# Patient Record
Sex: MALE | Race: WHITE
[De-identification: names, ages, dates, MRNs, and addresses within clinical notes are randomized per-mention and may not be internally consistent; named-entity substitution may affect disease eponyms.]

---

## 2017-01-02 ENCOUNTER — HOSPITAL ENCOUNTER (EMERGENCY)
Dept: HOSPITAL 10 - FTE | Age: 32
Discharge: HOME | End: 2017-01-02
Payer: COMMERCIAL

## 2017-01-02 VITALS
HEIGHT: 68 IN | WEIGHT: 142.2 LBS | BODY MASS INDEX: 21.55 KG/M2 | BODY MASS INDEX: 21.55 KG/M2 | HEIGHT: 68 IN | WEIGHT: 142.2 LBS

## 2017-01-02 DIAGNOSIS — E10.9: ICD-10-CM

## 2017-01-02 DIAGNOSIS — A08.4: Primary | ICD-10-CM

## 2017-01-02 LAB
ADD UMIC: NO
ALBUMIN SERPL-MCNC: 5 G/DL (ref 3.3–4.9)
ALBUMIN/GLOB SERPL: 1.38 {RATIO}
ALP SERPL-CCNC: 90 IU/L (ref 42–121)
ALT SERPL-CCNC: 35 IU/L (ref 13–69)
ANION GAP SERPL CALC-SCNC: 21 MMOL/L (ref 8–16)
AST SERPL-CCNC: 46 IU/L (ref 15–46)
BASOPHILS # BLD AUTO: 0 10^3/UL (ref 0–0.1)
BASOPHILS NFR BLD: 0.2 % (ref 0–2)
BILIRUB DIRECT SERPL-MCNC: 0 MG/DL (ref 0–0.2)
BILIRUB SERPL-MCNC: 0.4 MG/DL (ref 0.2–1.3)
BUN SERPL-MCNC: 14 MG/DL (ref 7–20)
CALCIUM SERPL-MCNC: 9.9 MG/DL (ref 8.4–10.2)
CHLORIDE SERPL-SCNC: 97 MMOL/L (ref 97–110)
CO2 SERPL-SCNC: 30 MMOL/L (ref 21–31)
COLOR UR: (no result)
CONDITION: 1
CREAT SERPL-MCNC: 0.66 MG/DL (ref 0.61–1.24)
EOSINOPHIL # BLD: 0 10^3/UL (ref 0–0.5)
EOSINOPHIL NFR BLD: 0.3 % (ref 0–7)
ERYTHROCYTE [DISTWIDTH] IN BLOOD BY AUTOMATED COUNT: 13.8 % (ref 11.5–14.5)
GLOBULIN SER-MCNC: 3.6 G/DL (ref 1.3–3.2)
GLUCOSE SERPL-MCNC: 290 MG/DL (ref 70–220)
GLUCOSE UR STRIP-MCNC: >=1000 %
HCT VFR BLD CALC: 52.8 % (ref 42–52)
HGB BLD-MCNC: 18 G/DL (ref 14–18)
KETONES UR STRIP.AUTO-MCNC: NEGATIVE MG/DL
LYMPHOCYTES # BLD AUTO: 1.3 10^3/UL (ref 0.8–2.9)
LYMPHOCYTES NFR BLD AUTO: 16 % (ref 15–51)
MCH RBC QN AUTO: 31.5 PG (ref 29–33)
MCHC RBC AUTO-ENTMCNC: 34.2 G/DL (ref 32–37)
MCV RBC AUTO: 92.3 FL (ref 82–101)
MONOCYTES # BLD: 0.4 10^3/UL (ref 0.3–0.9)
MONOCYTES NFR BLD: 5.3 % (ref 0–11)
NEUTROPHILS # BLD: 6.4 10^3/UL (ref 1.6–7.5)
NEUTROPHILS NFR BLD AUTO: 78.2 % (ref 39–77)
NITRITE UR QL STRIP.AUTO: NEGATIVE
NRBC # BLD MANUAL: 0 10^3/UL (ref 0–0)
NRBC BLD QL: 0 /100WBC (ref 0–0)
PLATELET # BLD: 212 10^3/UL (ref 140–440)
PMV BLD AUTO: 10 FL (ref 7.4–10.4)
POTASSIUM SERPL-SCNC: 4.7 MMOL/L (ref 3.5–5.1)
PROT SERPL-MCNC: 8.6 G/DL (ref 6.1–8.1)
RBC # BLD AUTO: 5.73 10^6/UL (ref 4.7–6.1)
RBC # UR AUTO: NEGATIVE /UL
SODIUM SERPL-SCNC: 143 MMOL/L (ref 135–144)
URINE BILIRUBIN (DIP): NEGATIVE
URINE TOTAL PROTEIN (DIP): NEGATIVE
UROBILINOGEN UR STRIP-ACNC: (no result) (ref 0.1–1)
WBC # BLD AUTO: 8.2 10^3/UL (ref 4.8–10.8)
WBC # BLD: 8.2 10^3/UL (ref 4.8–10.8)
WBC # UR STRIP: NEGATIVE /UL

## 2017-01-02 PROCEDURE — 36415 COLL VENOUS BLD VENIPUNCTURE: CPT

## 2017-01-02 PROCEDURE — 85025 COMPLETE CBC W/AUTO DIFF WBC: CPT

## 2017-01-02 PROCEDURE — 96375 TX/PRO/DX INJ NEW DRUG ADDON: CPT

## 2017-01-02 PROCEDURE — 96374 THER/PROPH/DIAG INJ IV PUSH: CPT

## 2017-01-02 PROCEDURE — 80053 COMPREHEN METABOLIC PANEL: CPT

## 2017-01-02 PROCEDURE — 96376 TX/PRO/DX INJ SAME DRUG ADON: CPT

## 2017-01-02 PROCEDURE — 83690 ASSAY OF LIPASE: CPT

## 2017-01-02 PROCEDURE — 81003 URINALYSIS AUTO W/O SCOPE: CPT

## 2017-01-02 PROCEDURE — 74176 CT ABD & PELVIS W/O CONTRAST: CPT

## 2017-01-02 NOTE — ERD
ER Documentation


Chief Complaint


Date/Time


DATE: 1/2/17 


TIME: 19:50


Chief Complaint


abd pain n/v/d started this morning





HPI


31-year-old male presents here in emergency department for complaints of 

abdominal pain, nausea vomiting diarrhea for 3 days, worse today. Patient 

described the pain as cramping pain, 6/10 scale accompanied with vomiting and 

diarrhea. Patient does not have any blood in the stool or black stool. Patient 

does not have any blood in the vomit. Patient does not have any or chills. 

Patient does not have any hematuria or dysuria. Patient is diabetic, has not 

been taking his insulin because of the vomiting and diarrhea, had hypoglycemic 

episodes at times.





ROS


All systems reviewed and are negative except as per history of present illness.





Medications


Home Meds


Reported Medications


Insulin Lispro (Humalog) 100 U/Ml Cartridge, 1 SC per sliding scale, EA


   9/22/14


Insulin Glargine* (Lantus*) 100 Unit/Ml Soln, 30 UNIT SC QAM, EA


   9/22/14





Allergies


Allergies:  


Coded Allergies:  


     No Known Allergy (Unverified , 1/2/17)





PMhx/Soc


History of Surgery:  Yes (APPENDECTOMY)


Anesthesia Reaction:  No


Hx Neurological Disorder:  No


Hx Respiratory Disorders:  No


Hx Cardiac Disorders:  No


Hx Psychiatric Problems:  No


Hx Miscellaneous Medical Probl:  Yes (IDDM)


Hx Alcohol Use:  Yes (SOBER FOR 4 MONTHS)


Hx Substance Use:  No


Hx Tobacco Use:  No


Smoking Status:  Never smoker





FmHx


Family History:  No coronary disease, No diabetes, No other





Physical Exam


Vitals





Vital Signs








  Date Time  Temp Pulse Resp B/P Pulse Ox O2 Delivery O2 Flow Rate FiO2


 


1/2/17 19:20 97.4 97 20 132/81 98   








Physical Exam


GENERAL:  The patient is well developed and appropriate for usual state of 

health, in no apparent distress.


CHEST:  Clear to auscultation bilaterally. There are no rales, wheezes or 

rhonchi. 


HEART:  Regular rate and rhythm. No murmurs, clicks, rubs or gallops. No S3 or 

S4.


ABDOMEN:  Soft, nontender and nondistended. Hyperactive bowel sounds. No 

rebound or guarding. No gross peritonitis. No gross organomegaly or masses. No 

Johnson sign or McBurney point tenderness.


BACK:  No midline or flank tenderness.


EXTREMITIES:  Equal pulses bilaterally. There is no peripheral clubbing, 

cyanosis or edema. No focal swelling or erythema. Full range of motion. Grossly 

neurovascularly intact.


NEURO:  Alert and oriented. Cranial nerves 2-12 intact. Motor strength in all 4 

extremities with 5/5 strength.  Sensation grossly intact. Normal speech and 

gait. 


SKIN:  There is no apparent rash or petechia. The skin is warm and dry.


HEMATOLOGIC AND LYMPHATIC:  There is no evidence of excessive bruising or 

lymphedema. No gross cervical, axillary, or inguinal lymphadenopathy.


Result Diagram:  


1/2/17 2008 1/2/17 2008





Results 24 hrs





 Laboratory Tests








Test


  1/2/17


20:08 1/2/17


20:13


 


Alanine Aminotransferase


(ALT/SGPT) 35IU/L 


  


 


 


Albumin 5.0g/dl  


 


Albumin/Globulin Ratio 1.38  


 


Alkaline Phosphatase 90IU/L  


 


Anion Gap 21  


 


Aspartate Amino Transf


(AST/SGOT) 46IU/L 


  


 


 


Basophils # 0.010^3/ul  


 


Basophils % 0.2%  


 


Blood Urea Nitrogen 14mg/dl  


 


Calcium Level 9.9mg/dl  


 


Carbon Dioxide Level 30mmol/L  


 


Chloride Level 97mmol/L  


 


Creatinine 0.66mg/dl  


 


Direct Bilirubin 0.00mg/dl  


 


Eosinophils # 0.010^3/ul  


 


Eosinophils % 0.3%  


 


Globulin 3.60g/dl  


 


Glucose Level 290mg/dl  


 


Hematocrit 52.8%  


 


Hemoglobin 18.0g/dl  


 


Indirect Bilirubin 0.4mg/dl  


 


Lipase 19U/L  


 


Lymphocytes # 1.310^3/ul  


 


Lymphocytes % 16.0%  


 


Mean Corpuscular Hemoglobin 31.5pg  


 


Mean Corpuscular Hemoglobin


Concent 34.2g/dl 


  


 


 


Mean Corpuscular Volume 92.3fl  


 


Mean Platelet Volume 10.0fl  


 


Monocytes # 0.410^3/ul  


 


Monocytes % 5.3%  


 


Neutrophils # 6.410^3/ul  


 


Neutrophils % 78.2%  


 


Nucleated Red Blood Cells # 0.010^3/ul  


 


Nucleated Red Blood Cells % 0.0/100WBC  


 


Platelet Count 21132^3/UL  


 


Potassium Level 4.7mmol/L  


 


Red Blood Count 5.7310^6/ul  


 


Red Cell Distribution Width 13.8%  


 


Sodium Level 143mmol/L  


 


Total Bilirubin 0.4mg/dl  


 


Total Protein 8.6g/dl  


 


White Blood Count 8.210^3/ul  


 


Urine Bilirubin  NEGATIVE 


 


Urine Clarity  CLEAR 


 


Urine Color  LT. YELLOW 


 


Urine Glucose  >=1000% 


 


Urine Hemoglobin  NEGATIVE 


 


Urine Ketones  NEGATIVE 


 


Urine Leukocyte Esterase  NEGATIVE 


 


Urine Nitrite  NEGATIVE 


 


Urine Specific Gravity  1.010 


 


Urine Total Protein  NEGATIVE 


 


Urine Urobilinogen  0.2  E.U./dL 


 


Urine pH  7.0 








 Current Medications








 Medications


  (Trade)  Dose


 Ordered  Sig/Kain


 Route


 PRN Reason  Start Time


 Stop Time Status Last Admin


Dose Admin


 


 Sodium Chloride


  (NS)  1,000 ml @ 


 1,000 mls/hr  Q1H STAT


 IV


   1/2/17 19:44


 1/2/17 20:43 DC 1/2/17 20:04


 


 


 Morphine Sulfate


  (morphine)  4 mg  ONCE  STAT


 IV


   1/2/17 19:44


 1/2/17 19:46 DC 1/2/17 20:04


 


 


 Ondansetron HCl


  (Zofran Inj)  4 mg  ONCE  STAT


 IV


   1/2/17 19:44


 1/2/17 19:46 DC 1/2/17 20:04


 


 


 Morphine Sulfate


  (morphine)  2 mg  ONCE  ONCE


 IV


   1/2/17 21:00


 1/2/17 21:03 DC  


 





Patient was given medication for pain here in emergency department, after 

treatment, patient verbalized feeling much better. Patient's pain is 

improved.Patient was given Zofran here in the emergency department. After 

treatment, patient was able to tolerate po fluids here in the emergency 

department without any vomiting. There is no signs and symptoms of dehydration. 


Normal saline IV bolus was given here in emergency department for rehydration, 

patient tolerated IV fluids.





PROCEDURE:   CT abdomen and pelvis without intravenous contrast. 


 


CLINICAL INDICATION: Pain.


 


TECHNIQUE:   CT of the abdomen/pelvis was performed utilizing axial images with 

reconstructions in sagittal and coronal planes. The administered radiation dose 

is CTDI 17 mGy,  mGy-cm. 


 


COMPARISON: 08/07/2014


 


FINDINGS:


 


Visualized Chest: The visualized lung bases are clear.


 


Abdomen:


 


The liver, spleen, pancreas, gallbladder,and adrenal glands are unremarkable.   


 


The kidneys are without hydronephrosis.  No definite urinary calculi are seen.


 


There is no evidence of bowel obstruction.  Prior appendectomy is noted.  No 

intra-abdominal free air is seen.  


 


There is no evidence of intra-abdominal adenopathy or free fluid.  Some mild 

skin thickening and subcutaneous fat infiltrative changes are noted within the 

lower anterior abdominal walls bilaterally, presumably due to medication 

injection.


 


 


Pelvis:


 


There is no evidence of pelvic adenopathy of free fluid.  The prostate and 

bladder are unremarkable.


 


Osseous structures: Unremarkable.


 


IMPRESSION:


 


No acute findings.


 


RPTAT: HIKT


_____________________________________________ 


.Erwin Lynn MD, MD           Date    Time 


Electronically viewed and signed by .Erwin Lynn MD, MD on 01/02/2017 20:46 


 


D:  01/02/2017 20:46  T:  01/02/2017 20:46


.T/





CC: YAA LIAO NP





Procedures/MDM


Medical Decision Making: Patient symptoms affect is consistent with viral 

gastroenteritis. Patient was given fluids here in emergency department for 

rehydration. No electrolyte noted. No symptoms of diabetic ketoacidosis noted. 

There is low suspicion for abdominal emergencies at this time. Patients 

abdominal exam is normal at this time. Patients radiology exam does not show 

any abdominal emergencies at this time. There is low suspicion for appendicitis

, cholecystitis, abdominal aortic aneurysms or peritonitis at this time.  There 

is low suspicion for sepsis. Patient appears well and is hemodynamically 

stable.Patient's diabetic symptoms have stabilized here in the emergency 

department and appear appropriate for outpatient management. No evidence at 

this time of diabetic ketoacidosis, hyperosmolar syndrome, or severe systemic 

infection.





Disposition: Home. Condition: Stable


Prescription Bentyl, Zofran, Norco


Instructions: Patient is advised to take medications as prescribed. Patient is 

advised to rest, increase fluid intake and do brat diet for next 1-2 days and 

progress as tolerated. Patient is advised that if symptoms are worse, severe 

abdominal pain, uncontrolled vomiting, high fever, severe flank pain, worst 

signs and symptoms, to return to the emergency department immediately.  

Otherwise, patient can follow up with primary care doctor in 5-7 days.





Departure


Diagnosis:  


 Primary Impression:  


 Viral gastroenteritis


Condition:  Stable


Patient Instructions:  Gastroenteritis, Viral (6Y-Adult)





Additional Instructions:  


Patient is advised to take medications as prescribed. Patient is advised to rest

, increase fluid intake and do brat diet for next 1-2 days and progress as 

tolerated. Patient is advised that if symptoms are worse, severe abdominal pain

, uncontrolled vomiting, high fever, severe flank pain, worst signs and symptoms

, to return to the emergency department immediately.  Otherwise, patient can 

follow up with primary care doctor in 5-7 days.











YAA LIAO NP Jan 2, 2017 19:54

## 2017-01-02 NOTE — RADRPT
PROCEDURE:   CT abdomen and pelvis without intravenous contrast. 

 

CLINICAL INDICATION: Pain.

 

TECHNIQUE:   CT of the abdomen/pelvis was performed utilizing axial images with reconstructions in s
agittal and coronal planes. The administered radiation dose is CTDI 17 mGy,  mGy-cm. 

 

COMPARISON: 08/07/2014

 

FINDINGS:

 

Visualized Chest: The visualized lung bases are clear.

 

Abdomen:

 

The liver, spleen, pancreas, gallbladder,and adrenal glands are unremarkable.   

 

The kidneys are without hydronephrosis.  No definite urinary calculi are seen.

 

There is no evidence of bowel obstruction.  Prior appendectomy is noted.  No intra-abdominal free ai
r is seen.  

 

There is no evidence of intra-abdominal adenopathy or free fluid.  Some mild skin thickening and sub
cutaneous fat infiltrative changes are noted within the lower anterior abdominal walls bilaterally, 
presumably due to medication injection.

 

 

Pelvis:

 

There is no evidence of pelvic adenopathy of free fluid.  The prostate and bladder are unremarkable.

 

Osseous structures: Unremarkable.

 

IMPRESSION:

 

No acute findings.

 

RPTAT: HIKT

_____________________________________________ 

.Erwin Lynn MD, MD           Date    Time 

Electronically viewed and signed by .Erwin Lynn MD, MD on 01/02/2017 20:46 

 

D:  01/02/2017 20:46  T:  01/02/2017 20:46

.T/

## 2017-02-01 ENCOUNTER — HOSPITAL ENCOUNTER (EMERGENCY)
Dept: HOSPITAL 54 - ER | Age: 32
LOS: 1 days | Discharge: HOME | End: 2017-02-02
Payer: MEDICAID

## 2017-02-01 VITALS — BODY MASS INDEX: 20.61 KG/M2 | WEIGHT: 136 LBS | HEIGHT: 68 IN

## 2017-02-01 DIAGNOSIS — G89.29: ICD-10-CM

## 2017-02-01 DIAGNOSIS — E11.9: ICD-10-CM

## 2017-02-01 DIAGNOSIS — Z90.89: ICD-10-CM

## 2017-02-01 DIAGNOSIS — R10.84: Primary | ICD-10-CM

## 2017-02-01 LAB
ADD UA MICROSCOPIC: YES
ALBUMIN SERPL BCP-MCNC: 4.1 G/DL (ref 3.4–5)
ALT SERPL W P-5'-P-CCNC: 69 U/L (ref 12–78)
ANION GAP SERPL CALC-SCNC: 11 MMOL/L (ref 5–14)
APTT PPP: 31 SEC (ref 23–34)
AST SERPL W P-5'-P-CCNC: 61 U/L (ref 15–37)
BACTERIA UR CULT: NO
BASOPHILS # BLD AUTO: 0 /CMM (ref 0–0.2)
BASOPHILS NFR BLD AUTO: 0 % (ref 0–2)
BILIRUB DIRECT SERPL-MCNC: 0.1 MG/DL (ref 0–0.2)
BILIRUB INDIRECT SERPL-MCNC: 0.4 MG/DL (ref 0–1.1)
BILIRUB SERPL-MCNC: 0.5 MG/DL (ref 0.2–1)
BUN SERPL-MCNC: 14 MG/DL (ref 7–18)
CALCIUM SERPL-MCNC: 9.5 MG/DL (ref 8.5–10.1)
CHLORIDE SERPL-SCNC: 101 MMOL/L (ref 98–107)
CO2 SERPL-SCNC: 31 MMOL/L (ref 21–32)
CREAT SERPL-MCNC: 0.8 MG/DL (ref 0.6–1.3)
DIFF TOTAL %: 100 %
EOSINOPHIL # BLD AUTO: 0 /CMM (ref 0–0.7)
EOSINOPHIL NFR BLD AUTO: 0.2 % (ref 0–6)
GFR SERPLBLD BASED ON 1.73 SQ M-ARVRAT: 114 ML/MIN (ref 60–?)
GLUCOSE SERPL-MCNC: 290 MG/DL (ref 74–106)
HCT VFR BLD AUTO: 49 % (ref 39–51)
HGB BLD-MCNC: 16.8 G/DL (ref 13.5–17.5)
INR PPP: 1.02 (ref 0.87–1.13)
KETONES UR STRIP-MCNC: (no result) MG/DL
LEUKOCYTE ESTERASE UR QL STRIP: NEGATIVE
LYMPHOCYTES NFR BLD AUTO: 1.8 /CMM (ref 0.8–4.8)
LYMPHOCYTES NFR BLD AUTO: 24.3 % (ref 20–44)
MCH RBC QN AUTO: 31 PG (ref 26–33)
MCHC RBC AUTO-ENTMCNC: 34 G/DL (ref 31–36)
MCV RBC AUTO: 92 FL (ref 80–96)
MONOCYTES NFR BLD AUTO: 0.4 /CMM (ref 0.1–1.3)
MONOCYTES NFR BLD AUTO: 5.5 % (ref 2–12)
NEUTROPHILS # BLD AUTO: 5.1 /CMM (ref 1.8–8.9)
NEUTROPHILS NFR BLD AUTO: 70 % (ref 43–81)
PH UR STRIP: 6.5 [PH] (ref 5–8)
PLATELET # BLD AUTO: 215 /CMM (ref 150–450)
POTASSIUM SERPL-SCNC: 4 MMOL/L (ref 3.5–5.1)
PROT SERPL-MCNC: 7.4 G/DL (ref 6.4–8.2)
PROTHROMBIN TIME: 11 SECS (ref 9.5–12.7)
RBC # BLD AUTO: 5.38 MIL/UL (ref 4.5–6)
RBC #/AREA URNS HPF: (no result) /HPF (ref 0–2)
SODIUM SERPL-SCNC: 139 MMOL/L (ref 136–145)
WBC #/AREA URNS HPF: (no result) /HPF (ref 0–3)
WBC NRBC COR # BLD AUTO: 7.3 K/UL (ref 4.3–11)

## 2017-02-01 PROCEDURE — Z7610: HCPCS

## 2017-02-01 PROCEDURE — A4606 OXYGEN PROBE USED W OXIMETER: HCPCS

## 2017-02-02 VITALS — SYSTOLIC BLOOD PRESSURE: 118 MMHG | DIASTOLIC BLOOD PRESSURE: 90 MMHG

## 2017-02-18 ENCOUNTER — HOSPITAL ENCOUNTER (EMERGENCY)
Dept: HOSPITAL 10 - E/R | Age: 32
Discharge: LEFT BEFORE DISCHARGE | End: 2017-02-18
Payer: SELF-PAY

## 2017-02-18 ENCOUNTER — HOSPITAL ENCOUNTER (EMERGENCY)
Dept: HOSPITAL 10 - FTE | Age: 32
LOS: 1 days | Discharge: HOME | End: 2017-02-19
Payer: COMMERCIAL

## 2017-02-18 VITALS
WEIGHT: 149.91 LBS | BODY MASS INDEX: 22.72 KG/M2 | HEIGHT: 68 IN | WEIGHT: 149.91 LBS | BODY MASS INDEX: 22.72 KG/M2 | HEIGHT: 68 IN

## 2017-02-18 VITALS
WEIGHT: 151.02 LBS | BODY MASS INDEX: 22.89 KG/M2 | BODY MASS INDEX: 22.89 KG/M2 | WEIGHT: 151.02 LBS | HEIGHT: 68 IN | HEIGHT: 68 IN

## 2017-02-18 DIAGNOSIS — Z53.21: Primary | ICD-10-CM

## 2017-02-18 DIAGNOSIS — E10.9: ICD-10-CM

## 2017-02-18 DIAGNOSIS — R10.84: Primary | ICD-10-CM

## 2017-02-18 LAB
ADD UMIC: NO
ALBUMIN SERPL-MCNC: 4.2 G/DL (ref 3.3–4.9)
ALBUMIN/GLOB SERPL: 1.68 {RATIO}
ALP SERPL-CCNC: 118 IU/L (ref 42–121)
ALT SERPL-CCNC: 79 IU/L (ref 13–69)
ANION GAP SERPL CALC-SCNC: 19 MMOL/L (ref 8–16)
AST SERPL-CCNC: 73 IU/L (ref 15–46)
BASOPHILS # BLD AUTO: 0 10^3/UL (ref 0–0.1)
BASOPHILS NFR BLD: 0.5 % (ref 0–2)
BILIRUB DIRECT SERPL-MCNC: 0 MG/DL (ref 0–0.2)
BILIRUB SERPL-MCNC: 0.1 MG/DL (ref 0.2–1.3)
BUN SERPL-MCNC: 9 MG/DL (ref 7–20)
CALCIUM SERPL-MCNC: 9.7 MG/DL (ref 8.4–10.2)
CHLORIDE SERPL-SCNC: 101 MMOL/L (ref 97–110)
CO2 SERPL-SCNC: 26 MMOL/L (ref 21–31)
COLOR UR: (no result)
CONDITION: 1
CREAT SERPL-MCNC: 0.51 MG/DL (ref 0.61–1.24)
EOSINOPHIL # BLD: 0.1 10^3/UL (ref 0–0.5)
EOSINOPHIL NFR BLD: 1.5 % (ref 0–7)
ERYTHROCYTE [DISTWIDTH] IN BLOOD BY AUTOMATED COUNT: 13.3 % (ref 11.5–14.5)
GLOBULIN SER-MCNC: 2.5 G/DL (ref 1.3–3.2)
GLUCOSE SERPL-MCNC: 171 MG/DL (ref 70–220)
GLUCOSE UR STRIP-MCNC: >=1000 %
HCT VFR BLD CALC: 48.4 % (ref 42–52)
HGB BLD-MCNC: 16.6 G/DL (ref 14–18)
KETONES UR STRIP.AUTO-MCNC: NEGATIVE MG/DL
LYMPHOCYTES # BLD AUTO: 2.8 10^3/UL (ref 0.8–2.9)
LYMPHOCYTES NFR BLD AUTO: 42.4 % (ref 15–51)
MCH RBC QN AUTO: 32.3 PG (ref 29–33)
MCHC RBC AUTO-ENTMCNC: 34.3 G/DL (ref 32–37)
MCV RBC AUTO: 94 FL (ref 82–101)
MONOCYTES # BLD: 0.5 10^3/UL (ref 0.3–0.9)
MONOCYTES NFR BLD: 7.7 % (ref 0–11)
NEUTROPHILS # BLD: 3.2 10^3/UL (ref 1.6–7.5)
NEUTROPHILS NFR BLD AUTO: 47.9 % (ref 39–77)
NITRITE UR QL STRIP.AUTO: NEGATIVE
NRBC # BLD MANUAL: 0 10^3/UL (ref 0–0)
NRBC BLD QL: 0 /100WBC (ref 0–0)
PLATELET # BLD: 200 10^3/UL (ref 140–440)
PMV BLD AUTO: 9.7 FL (ref 7.4–10.4)
POTASSIUM SERPL-SCNC: 4.6 MMOL/L (ref 3.5–5.1)
PROT SERPL-MCNC: 6.7 G/DL (ref 6.1–8.1)
RBC # BLD AUTO: 5.15 10^6/UL (ref 4.7–6.1)
RBC # UR AUTO: NEGATIVE /UL
SODIUM SERPL-SCNC: 141 MMOL/L (ref 135–144)
URINE BILIRUBIN (DIP): NEGATIVE
URINE TOTAL PROTEIN (DIP): NEGATIVE
UROBILINOGEN UR STRIP-ACNC: (no result) (ref 0.1–1)
WBC # BLD AUTO: 6.6 10^3/UL (ref 4.8–10.8)
WBC # BLD: 6.6 10^3/UL (ref 4.8–10.8)
WBC # UR STRIP: NEGATIVE /UL

## 2017-02-18 PROCEDURE — 82962 GLUCOSE BLOOD TEST: CPT

## 2017-02-18 PROCEDURE — 74176 CT ABD & PELVIS W/O CONTRAST: CPT

## 2017-02-18 PROCEDURE — 81003 URINALYSIS AUTO W/O SCOPE: CPT

## 2017-02-18 PROCEDURE — 80053 COMPREHEN METABOLIC PANEL: CPT

## 2017-02-18 PROCEDURE — 36415 COLL VENOUS BLD VENIPUNCTURE: CPT

## 2017-02-18 PROCEDURE — 83690 ASSAY OF LIPASE: CPT

## 2017-02-18 PROCEDURE — 85025 COMPLETE CBC W/AUTO DIFF WBC: CPT

## 2017-02-18 NOTE — RADRPT
PROCEDURE:   CT Abdomen and Pelvis without intravenous contrast. 

 

CLINICAL INDICATION:   Abdominal pain . 

 

TECHNIQUE:   CT scan of the abdomen and pelvis without intravenous contrast was performed on a multi
-slice CT scanner. Coronal and sagittal reformatted images were obtained from the axial source image
s. Images were reviewed on a high-resolution PACS workstation. 

Total DLP =  415.2 mGy-cm. CTDIvol = 6.6 mGy.

One or more of the following dose reduction techniques were used:

Automated exposure control.

Adjustment of the mA and/or kV according to patient size.

Use of iterative reconstruction technique.

 

COMPARISON:   01/02/2017 

 

FINDINGS:

 

CT abdomen and pelvis:

 

The lung bases are clear.  The heart size is normal in size.  The liver is normal in size and densit
y without focal mass or intrahepatic biliary dilatation.  The spleen is normal in size and homogeneo
us in density.   The pancreas as visualized is normal.  The gallbladder  shows no evidence of stones
 or distension .  The adrenal glands are normal.  The kidneys are symmetric in size.  There is a 2 m
m nonobstructing stone in the right kidney.. There is no evidence of obstructive uropathy. 

 

The stomach is distended with ingested contents.  . The small bowels are unremarkable. The colon and
 rectum are normal. There is mild retained feces throughout the colon.  There is no evidence of appe
ndicitis or diverticulitis. The pelvic organs are normal. The bladder is normal. There is no abdomin
al or pelvic adenopathy, free fluid, free air, mass or mesenteric inflammation. 

 

The aorta is normal in caliber with calcific atherosclerosis . The osseous structures are intact. No
 osteolytic or osteoblastic lesions are identified.  The soft tissues are within normal limits.  Lac
k of IV and oral contrast limits sensitivity of exam. 

 

IMPRESSION:

 

1.  Unremarkable noncontrast CT scan of the abdomen and pelvis.

 

2 (Benign). 

Recommend routine annual screening mammogram, unless there are new clinical findings in the interim.

_____________________________________________ 

.Danyel Arechiga MD, MD           Date    Time 

Electronically viewed and signed by .Danyel Arechiga MD, MD on 02/18/2017 23:21 

 

D:  02/18/2017 23:21  T:  02/18/2017 23:21

.L/

## 2017-02-19 VITALS
RESPIRATION RATE: 18 BRPM | TEMPERATURE: 98.1 F | DIASTOLIC BLOOD PRESSURE: 64 MMHG | SYSTOLIC BLOOD PRESSURE: 106 MMHG | HEART RATE: 72 BPM

## 2017-02-19 NOTE — ERD
DATE OF SERVICE:  

 

 

HISTORY OF PRESENT ILLNESS:  The patient is a 31-year-old male complaining of abdominal pain since y
esterday.  The patient states he has had similar abdominal pain multiple times over the last few yea
rs.  He has not taken medications for his symptoms.  He states he does not have any chest pain or sh
ortness of breath.  No vomiting, no change in urination or bowel movements.

 

MEDICAL HISTORY:  Diabetes, insulin-dependent.

 

ALLERGIES TO MEDICATIONS:  Denies.

 

SURGICAL HISTORY:  Appendectomy.

 

SOCIAL HISTORY:  Smokes ____.

 

REVIEW OF SYSTEMS:  A 12-point review of systems was done.  Refer to HPI for positives, all other sy
stems negative.

 

PHYSICAL EXAMINATION

VITAL SIGNS:  Temperature is 97.9, pulse 82, blood pressure is 125/75, respiratory 18, O2 saturation
 98% on room air.  Pain intensity 8/10.

GENERAL:  The patient is well-appearing, well-nourished, no acute distress.

HEENT:  Atraumatic. Conjunctivae are pink. Pupils equal, round, and reactive to light. There is no s
cleral icterus.  Tympanic membranes clear bilaterally. Oropharynx clear. No nystagmus or photophobia
. 

CHEST:  Clear to auscultation bilaterally. There are no rales, wheezes or rhonchi. 

HEART:  Regular rate and rhythm. No murmurs, clicks, rubs or gallops. No S3 or S4.

ABDOMEN:  Normoactive bowel sounds to auscultation.  No distention or organomegaly.  The patient has
 mild generalized tenderness to palpation.  However, no rebound tenderness or distention.  

 

EMERGENCY ROOM COURSE:  The patient had blood work done in the ER.  CBC was within normal limits.  C
MP was within normal limits and patient's urine was negative.  The patient had a CT abdomen and pelv
is which showed unremarkable noncontrast CT scan of abdomen and pelvis.  The patient also was given 
Zofran and Norco in the ER.

 

DIAGNOSIS:  Abdominal pain, unspecified.

 

MEDICAL DECISION MAKING:  The patient's exam is nonconcerning.  Blood work and imaging was within no
rmal limits and vital signs are stable.  I have low suspicion for abdominal emergency at this time.

 

DISCHARGE:  The patient is discharged stable.  The patient is given a prescription for Norco and Pep
chelle and told to follow up with primary care within 1 to 2 days for reevaluation.  The patient was to
ld if symptoms progress or worsen to return to the ER.  All other questions answered at time of disc
harge.  Discharge summary given at the time of departure.  The patient understood and complied with 
plan.

 

 

Dictated By: DIMAS HANNA for BETSY RUIZ/RIVAS

DD:    02/19/2017 00:17:26

DT:    02/19/2017 06:40:07

Conf#: [id number]

DID#:  [id number]

## 2017-04-10 ENCOUNTER — HOSPITAL ENCOUNTER (EMERGENCY)
Dept: HOSPITAL 54 - ER | Age: 32
Discharge: LEFT BEFORE DISCHARGE | End: 2017-04-10
Payer: MEDICAID

## 2017-04-10 VITALS — BODY MASS INDEX: 21.22 KG/M2 | WEIGHT: 140 LBS | HEIGHT: 68 IN

## 2017-04-10 VITALS — SYSTOLIC BLOOD PRESSURE: 128 MMHG | DIASTOLIC BLOOD PRESSURE: 81 MMHG

## 2017-04-10 DIAGNOSIS — Z53.21: Primary | ICD-10-CM

## 2017-04-10 PROCEDURE — Z7610: HCPCS

## 2017-04-10 PROCEDURE — A4606 OXYGEN PROBE USED W OXIMETER: HCPCS

## 2017-06-07 ENCOUNTER — HOSPITAL ENCOUNTER (EMERGENCY)
Dept: HOSPITAL 54 - ER | Age: 32
Discharge: HOME | End: 2017-06-07
Payer: MEDICAID

## 2017-06-07 VITALS — WEIGHT: 148 LBS | HEIGHT: 68 IN | BODY MASS INDEX: 22.43 KG/M2

## 2017-06-07 VITALS — DIASTOLIC BLOOD PRESSURE: 84 MMHG | SYSTOLIC BLOOD PRESSURE: 122 MMHG

## 2017-06-07 DIAGNOSIS — Z90.89: ICD-10-CM

## 2017-06-07 DIAGNOSIS — R10.9: Primary | ICD-10-CM

## 2017-06-07 DIAGNOSIS — G89.29: ICD-10-CM

## 2017-06-07 DIAGNOSIS — E11.9: ICD-10-CM

## 2017-06-07 DIAGNOSIS — Z79.4: ICD-10-CM

## 2017-06-07 LAB
ALBUMIN SERPL BCP-MCNC: 4.3 G/DL (ref 3.4–5)
ALP SERPL-CCNC: 106 U/L (ref 46–116)
ALT SERPL W P-5'-P-CCNC: 21 U/L (ref 12–78)
AST SERPL W P-5'-P-CCNC: 16 U/L (ref 15–37)
BASOPHILS # BLD AUTO: 0 /CMM (ref 0–0.2)
BASOPHILS NFR BLD AUTO: 0.2 % (ref 0–2)
BILIRUB SERPL-MCNC: 0.4 MG/DL (ref 0.2–1)
BUN SERPL-MCNC: 11 MG/DL (ref 7–18)
CALCIUM SERPL-MCNC: 9.2 MG/DL (ref 8.5–10.1)
CHLORIDE SERPL-SCNC: 98 MMOL/L (ref 98–107)
CO2 SERPL-SCNC: 26 MMOL/L (ref 21–32)
CREAT SERPL-MCNC: 0.9 MG/DL (ref 0.6–1.3)
EOSINOPHIL # BLD AUTO: 0 /CMM (ref 0–0.7)
EOSINOPHIL NFR BLD AUTO: 0.3 % (ref 0–6)
GLUCOSE SERPL-MCNC: 303 MG/DL (ref 74–106)
HCT VFR BLD AUTO: 52 % (ref 39–51)
HGB BLD-MCNC: 18.1 G/DL (ref 13.5–17.5)
LIPASE SERPL-CCNC: 78 U/L (ref 73–393)
LYMPHOCYTES NFR BLD AUTO: 2.2 /CMM (ref 0.8–4.8)
LYMPHOCYTES NFR BLD AUTO: 23.3 % (ref 20–44)
MCH RBC QN AUTO: 33 PG (ref 26–33)
MCHC RBC AUTO-ENTMCNC: 35 G/DL (ref 31–36)
MCV RBC AUTO: 94 FL (ref 80–96)
MONOCYTES NFR BLD AUTO: 0.4 /CMM (ref 0.1–1.3)
MONOCYTES NFR BLD AUTO: 4.4 % (ref 2–12)
NEUTROPHILS # BLD AUTO: 6.8 /CMM (ref 1.8–8.9)
NEUTROPHILS NFR BLD AUTO: 71.8 % (ref 43–81)
PLATELET # BLD AUTO: 237 /CMM (ref 150–450)
POTASSIUM SERPL-SCNC: 3.8 MMOL/L (ref 3.5–5.1)
PROT SERPL-MCNC: 8.2 G/DL (ref 6.4–8.2)
RBC # BLD AUTO: 5.5 MIL/UL (ref 4.5–6)
RDW COEFFICIENT OF VARIATION: 14.2 (ref 11.5–15)
SODIUM SERPL-SCNC: 137 MMOL/L (ref 136–145)
WBC NRBC COR # BLD AUTO: 9.5 K/UL (ref 4.3–11)

## 2017-06-07 PROCEDURE — Z7610: HCPCS

## 2017-06-07 PROCEDURE — A4606 OXYGEN PROBE USED W OXIMETER: HCPCS

## 2017-06-07 NOTE — NUR
PT A/OX4 BREATHING EFFORTLESSLY ON ROOM AIR, PT STATES HE HAS BEEN HVAING ABD 
PAIN WITH NAUSEA BUT DENIES VOMITNG, PT STATES HE JUST HAD A BIOPSY DONE ON HIS 
STOMACH AND RAN OUT FO HIS PAIN MEDICATION, PT STATES HE CALLED HIS PMD BUT HE 
[place], IV PLACED LABS DRAWN, MD MADE AWARE WILL CONTINUE TO MONITOR.

## 2017-11-02 ENCOUNTER — HOSPITAL ENCOUNTER (EMERGENCY)
Dept: HOSPITAL 54 - ER | Age: 32
Discharge: HOME | End: 2017-11-02
Payer: MEDICAID

## 2017-11-02 VITALS — WEIGHT: 132 LBS | BODY MASS INDEX: 20 KG/M2 | HEIGHT: 68 IN

## 2017-11-02 VITALS — DIASTOLIC BLOOD PRESSURE: 66 MMHG | SYSTOLIC BLOOD PRESSURE: 110 MMHG

## 2017-11-02 DIAGNOSIS — Z53.21: Primary | ICD-10-CM

## 2017-11-02 PROCEDURE — Z7610: HCPCS

## 2017-11-02 PROCEDURE — A4606 OXYGEN PROBE USED W OXIMETER: HCPCS

## 2018-01-14 ENCOUNTER — HOSPITAL ENCOUNTER (EMERGENCY)
Dept: HOSPITAL 54 - ER | Age: 33
Discharge: HOME | End: 2018-01-14
Payer: MEDICAID

## 2018-01-14 VITALS — BODY MASS INDEX: 20.92 KG/M2 | WEIGHT: 138 LBS | HEIGHT: 68 IN

## 2018-01-14 VITALS — SYSTOLIC BLOOD PRESSURE: 107 MMHG | DIASTOLIC BLOOD PRESSURE: 68 MMHG

## 2018-01-14 DIAGNOSIS — Z79.4: ICD-10-CM

## 2018-01-14 DIAGNOSIS — F10.10: ICD-10-CM

## 2018-01-14 DIAGNOSIS — G89.29: ICD-10-CM

## 2018-01-14 DIAGNOSIS — E10.9: ICD-10-CM

## 2018-01-14 DIAGNOSIS — Z90.89: ICD-10-CM

## 2018-01-14 DIAGNOSIS — M79.671: Primary | ICD-10-CM

## 2018-01-14 PROCEDURE — 73630 X-RAY EXAM OF FOOT: CPT

## 2018-01-14 PROCEDURE — A4606 OXYGEN PROBE USED W OXIMETER: HCPCS

## 2018-01-14 PROCEDURE — 99284 EMERGENCY DEPT VISIT MOD MDM: CPT

## 2018-01-14 PROCEDURE — Z7610: HCPCS

## 2019-01-02 ENCOUNTER — HOSPITAL ENCOUNTER (EMERGENCY)
Dept: HOSPITAL 54 - ER | Age: 34
Discharge: HOME | End: 2019-01-02
Payer: MEDICAID

## 2019-01-02 VITALS — HEIGHT: 68 IN | BODY MASS INDEX: 21.22 KG/M2 | WEIGHT: 140 LBS

## 2019-01-02 VITALS — DIASTOLIC BLOOD PRESSURE: 77 MMHG | SYSTOLIC BLOOD PRESSURE: 129 MMHG

## 2019-01-02 DIAGNOSIS — Z79.4: ICD-10-CM

## 2019-01-02 DIAGNOSIS — E10.9: ICD-10-CM

## 2019-01-02 DIAGNOSIS — G89.29: ICD-10-CM

## 2019-01-02 DIAGNOSIS — R07.89: Primary | ICD-10-CM

## 2019-01-02 DIAGNOSIS — F17.200: ICD-10-CM

## 2019-01-02 DIAGNOSIS — Z71.6: ICD-10-CM

## 2019-01-02 DIAGNOSIS — R10.9: ICD-10-CM

## 2019-01-02 DIAGNOSIS — Z90.89: ICD-10-CM

## 2019-01-02 LAB
ALBUMIN SERPL BCP-MCNC: 3.6 G/DL (ref 3.4–5)
ALP SERPL-CCNC: 120 U/L (ref 46–116)
ALT SERPL W P-5'-P-CCNC: 15 U/L (ref 12–78)
AST SERPL W P-5'-P-CCNC: 9 U/L (ref 15–37)
BASOPHILS # BLD AUTO: 0.1 /CMM (ref 0–0.2)
BASOPHILS NFR BLD AUTO: 0.7 % (ref 0–2)
BILIRUB DIRECT SERPL-MCNC: 0 MG/DL (ref 0–0.2)
BILIRUB SERPL-MCNC: 0.2 MG/DL (ref 0.2–1)
BUN SERPL-MCNC: 15 MG/DL (ref 7–18)
CALCIUM SERPL-MCNC: 9.2 MG/DL (ref 8.5–10.1)
CHLORIDE SERPL-SCNC: 101 MMOL/L (ref 98–107)
CO2 SERPL-SCNC: 31 MMOL/L (ref 21–32)
CREAT SERPL-MCNC: 0.8 MG/DL (ref 0.6–1.3)
EOSINOPHIL NFR BLD AUTO: 1.4 % (ref 0–6)
GLUCOSE SERPL-MCNC: 212 MG/DL (ref 74–106)
HCT VFR BLD AUTO: 48 % (ref 39–51)
HGB BLD-MCNC: 16.6 G/DL (ref 13.5–17.5)
LIPASE SERPL-CCNC: 41 U/L (ref 73–393)
LYMPHOCYTES NFR BLD AUTO: 2.6 /CMM (ref 0.8–4.8)
LYMPHOCYTES NFR BLD AUTO: 34.9 % (ref 20–44)
MCHC RBC AUTO-ENTMCNC: 35 G/DL (ref 31–36)
MCV RBC AUTO: 90 FL (ref 80–96)
MONOCYTES NFR BLD AUTO: 0.4 /CMM (ref 0.1–1.3)
MONOCYTES NFR BLD AUTO: 5.9 % (ref 2–12)
NEUTROPHILS # BLD AUTO: 4.3 /CMM (ref 1.8–8.9)
NEUTROPHILS NFR BLD AUTO: 57.1 % (ref 43–81)
PLATELET # BLD AUTO: 200 /CMM (ref 150–450)
POTASSIUM SERPL-SCNC: 4 MMOL/L (ref 3.5–5.1)
PROT SERPL-MCNC: 7 G/DL (ref 6.4–8.2)
RBC # BLD AUTO: 5.33 MIL/UL (ref 4.5–6)
SODIUM SERPL-SCNC: 139 MMOL/L (ref 136–145)
WBC NRBC COR # BLD AUTO: 7.5 K/UL (ref 4.3–11)

## 2019-01-02 NOTE — NUR
Patient does not wish to proceed with medical care recommended by Dr. WHITFIELD. 
Patient given information related to possible complications, up to and 
including death, which could occur as a result of leaving the hospital at this 
time. Patient verbalizes understanding of risks involved due to leaving against 
medical advice. Patient has signed AMA form.

## 2019-01-02 NOTE — NUR
PT PRESENTED TO THE ER WITH A C/O ABD PAIN THAT RADIATES TO LT UPPER RIBS/ 
CHEST (BELOW THE LEFT BREAST). PT DENIES SOB AND NAUSEA. PT IS ON THE MONITOR 
AND CONTINUOUS PULSE OX.

## 2019-03-27 ENCOUNTER — HOSPITAL ENCOUNTER (EMERGENCY)
Dept: HOSPITAL 10 - FTE | Age: 34
Discharge: HOME | End: 2019-03-27
Payer: COMMERCIAL

## 2019-03-27 ENCOUNTER — HOSPITAL ENCOUNTER (EMERGENCY)
Dept: HOSPITAL 91 - FTE | Age: 34
Discharge: HOME | End: 2019-03-27
Payer: COMMERCIAL

## 2019-03-27 VITALS
WEIGHT: 166.89 LBS | WEIGHT: 166.89 LBS | BODY MASS INDEX: 25.29 KG/M2 | HEIGHT: 68 IN | HEIGHT: 68 IN | BODY MASS INDEX: 25.29 KG/M2

## 2019-03-27 VITALS — DIASTOLIC BLOOD PRESSURE: 90 MMHG | HEART RATE: 72 BPM | SYSTOLIC BLOOD PRESSURE: 146 MMHG | RESPIRATION RATE: 18 BRPM

## 2019-03-27 DIAGNOSIS — E10.9: ICD-10-CM

## 2019-03-27 DIAGNOSIS — M54.40: Primary | ICD-10-CM

## 2019-03-27 DIAGNOSIS — F17.210: ICD-10-CM

## 2019-03-27 DIAGNOSIS — Z79.4: ICD-10-CM

## 2019-03-27 LAB
ADD MAN DIFF?: NO
ADD UMIC: NO
ANION GAP: 13 (ref 5–13)
BASOPHIL #: 0.1 10^3/UL (ref 0–0.1)
BASOPHILS %: 0.7 % (ref 0–2)
BLOOD UREA NITROGEN: 9 MG/DL (ref 7–20)
CALCIUM: 9.4 MG/DL (ref 8.4–10.2)
CARBON DIOXIDE: 28 MMOL/L (ref 21–31)
CHLORIDE: 102 MMOL/L (ref 97–110)
CREATININE: 0.58 MG/DL (ref 0.61–1.24)
EOSINOPHILS #: 0.1 10^3/UL (ref 0–0.5)
EOSINOPHILS %: 1 % (ref 0–7)
GLUCOSE: 234 MG/DL (ref 70–220)
HEMATOCRIT: 44.1 % (ref 42–52)
HEMOGLOBIN: 14.8 G/DL (ref 14–18)
IMMATURE GRANS #M: 0.02 10^3/UL (ref 0–0.03)
IMMATURE GRANS % (M): 0.3 % (ref 0–0.43)
LYMPHOCYTES #: 2.9 10^3/UL (ref 0.8–2.9)
LYMPHOCYTES %: 38.3 % (ref 15–51)
MEAN CORPUSCULAR HEMOGLOBIN: 30.1 PG (ref 29–33)
MEAN CORPUSCULAR HGB CONC: 33.6 G/DL (ref 32–37)
MEAN CORPUSCULAR VOLUME: 89.6 FL (ref 82–101)
MEAN PLATELET VOLUME: 10.8 FL (ref 7.4–10.4)
MONOCYTE #: 0.5 10^3/UL (ref 0.3–0.9)
MONOCYTES %: 6.3 % (ref 0–11)
NEUTROPHIL #: 4.1 10^3/UL (ref 1.6–7.5)
NEUTROPHILS %: 53.4 % (ref 39–77)
NUCLEATED RED BLOOD CELLS #: 0 10^3/UL (ref 0–0)
NUCLEATED RED BLOOD CELLS%: 0 /100WBC (ref 0–0)
PLATELET COUNT: 202 10^3/UL (ref 140–415)
POTASSIUM: 4 MMOL/L (ref 3.5–5.1)
RED BLOOD COUNT: 4.92 10^6/UL (ref 4.7–6.1)
RED CELL DISTRIBUTION WIDTH: 12.2 % (ref 11.5–14.5)
SODIUM: 143 MMOL/L (ref 135–144)
UR ASCORBIC ACID: NEGATIVE MG/DL
UR BILIRUBIN (DIP): NEGATIVE MG/DL
UR BLOOD (DIP): NEGATIVE MG/DL
UR CLARITY: CLEAR
UR COLOR: YELLOW
UR GLUCOSE (DIP): (no result) MG/DL
UR KETONES (DIP): NEGATIVE MG/DL
UR LEUKOCYTE ESTERASE (DIP): NEGATIVE LEU/UL
UR NITRITE (DIP): NEGATIVE MG/DL
UR PH (DIP): 5 (ref 5–9)
UR SPECIFIC GRAVITY (DIP): 1.03 (ref 1–1.03)
UR TOTAL PROTEIN (DIP): NEGATIVE MG/DL
UR UROBILINOGEN (DIP): NEGATIVE MG/DL
WHITE BLOOD COUNT: 7.7 10^3/UL (ref 4.8–10.8)

## 2019-03-27 PROCEDURE — 72100 X-RAY EXAM L-S SPINE 2/3 VWS: CPT

## 2019-03-27 PROCEDURE — 99284 EMERGENCY DEPT VISIT MOD MDM: CPT

## 2019-03-27 PROCEDURE — 36415 COLL VENOUS BLD VENIPUNCTURE: CPT

## 2019-03-27 PROCEDURE — 80048 BASIC METABOLIC PNL TOTAL CA: CPT

## 2019-03-27 PROCEDURE — 81003 URINALYSIS AUTO W/O SCOPE: CPT

## 2019-03-27 PROCEDURE — 73510: CPT

## 2019-03-27 PROCEDURE — 85025 COMPLETE CBC W/AUTO DIFF WBC: CPT

## 2019-03-27 NOTE — ERD
ER Documentation


Chief Complaint


Chief Complaint





RIGHT HIP AND LEG PAIN/WEAKNESS X 4 WEEKS.





HPI


33-year-old male, with type 1 diabetes, presents to the emergency department, 


complaining of lower back pain, radiating to the right hip and lower extremity. 


The pain is sharp, constant, 7/10.  He denies fevers, no chills.





ROS


All systems reviewed and are negative except as per history of present illness.





Medications


Home Meds


Active Scripts


Baclofen* (Baclofen*) 10 Mg Tablet, 10 MG PO TID, #15 TAB


   Prov:GILDA PATTON MD         3/27/19


Hydrocodone/Acetaminophen (Norco 5-325 Tablet) 1 Each Tablet, 1 TAB PO BID PRN 


for PAIN for 5 Days, #10 TAB


   Prov:GILDA PATTON MD         3/27/19


Famotidine* (Pepcid*) 20 Mg Tablet, 20 MG PO BID for 4 Days, #30 TAB


   Prov:BONNIE CHASE PA-C         17


Hydrocodone/Acetaminophen (Norco 5-325 Tablet) 1 Each Tablet, 1 TAB PO Q6H PRN 


for PAIN, #7 TAB


   Prov:BONNIE CHASE PA-C         17


Ondansetron (Ondansetron Odt) 4 Mg Tab.rapdis, 4 MG PO Q8 PRN for NAUSEA AND/OR 


VOMITING, #30 TAB


   Prov:YAA LIAO NP         17


Hydrocodone/Acetaminophen (Norco 5-325 Tablet) 1 Each Tablet, 1 TAB PO Q6H PRN 


for SEVERE PAIN LEVEL 7-10, #20 TAB


   Prov:YAA LIAO NP         17


Dicyclomine Hcl* (Bentyl*) 20 Mg Tablet, 20 MG PO QID, #20 TAB


   Prov:YAA LIAO NP         17


Reported Medications


Insulin Lispro (Humalog) 100 U/Ml Cartridge, 1 SC per sliding scale, EA


   14


Insulin Glargine* (Lantus*) 100 Unit/Ml Soln, 30 UNIT SC QAM, EA


   14





Allergies


Allergies:  


Coded Allergies:  


     No Known Allergy (Unverified , 1/2/17)





PMhx/Soc


Medical and Surgical Hx:  pt denies Medical Hx, pt denies Surgical Hx


History of Surgery:  Yes (APPENDECTOMY)


Anesthesia Reaction:  No


Hx Neurological Disorder:  No


Hx Respiratory Disorders:  No


Hx Cardiac Disorders:  No


Hx Psychiatric Problems:  No


Hx Miscellaneous Medical Probl:  Yes (IDDM)


Hx Alcohol Use:  No


Hx Substance Use:  No


Hx Tobacco Use:  No


Smoking Status:  Current every day smoker





Physical Exam


Vitals





Vital Signs


  Date      Temp  Pulse  Resp  B/P (MAP)   Pulse Ox  O2          O2 Flow    FiO2


Time                                                 Delivery    Rate


   3/27/19  98.2     72    18      146/90        97  Room Air


     05:30                          (108)


   3/27/19  98.8    101    20      139/74        97


     02:57                           (95)





Physical Exam


Const:   No acute distress


Head:   Atraumatic 


Eyes:    Normal Conjunctiva


ENT:    Normal External Ears, Nose and Mouth.


Neck:               Full range of motion. No meningismus.


Resp:   Clear to auscultation bilaterally


Cardio:   Regular rate and rhythm, no murmurs


Abd:    Soft, non tender, non distended. Normal bowel sounds


Skin:   No petechiae or rashes


Back:   No midline or flank tenderness


Ext:    No cyanosis, or edema


Neur:   Awake and alert


Psych:    Normal Mood and Affect


Result Diagram:  


3/27/19 0403                                                                    


           3/27/19 0403





Results 24 hrs





Laboratory Tests


      Test
                                 3/27/19
04:03    3/27/19
04:17


      White Blood Count                      7.7 10^3/ul


      Red Blood Count                       4.92 10^6/ul


      Hemoglobin                               14.8 g/dl


      Hematocrit                                  44.1 %


      Mean Corpuscular Volume                    89.6 fl


      Mean Corpuscular Hemoglobin                30.1 pg


      Mean Corpuscular Hemoglobin
Concent     33.6 g/dl 
  



      Red Cell Distribution Width                 12.2 %


      Platelet Count                         202 10^3/UL


      Mean Platelet Volume                       10.8 fl


      Immature Granulocytes %                    0.300 %


      Neutrophils %                               53.4 %


      Lymphocytes %                               38.3 %


      Monocytes %                                  6.3 %


      Eosinophils %                                1.0 %


      Basophils %                                  0.7 %


      Nucleated Red Blood Cells %            0.0 /100WBC


      Immature Granulocytes #              0.020 10^3/ul


      Neutrophils #                          4.1 10^3/ul


      Lymphocytes #                          2.9 10^3/ul


      Monocytes #                            0.5 10^3/ul


      Eosinophils #                          0.1 10^3/ul


      Basophils #                            0.1 10^3/ul


      Nucleated Red Blood Cells #            0.0 10^3/ul


      Sodium Level                            143 mmol/L


      Potassium Level                         4.0 mmol/L


      Chloride Level                          102 mmol/L


      Carbon Dioxide Level                     28 mmol/L


      Anion Gap                                       13


      Blood Urea Nitrogen                        9 mg/dl


      Creatinine                              0.58 mg/dl


      Est Glomerular Filtrat Rate
mL/min   > 60 mL/min 
   



      Glucose Level                            234 mg/dl


      Calcium Level                            9.4 mg/dl


      Urine Color                                          YELLOW


      Urine Clarity                                        CLEAR


      Urine pH                                                        5.0


      Urine Specific Gravity                                        1.032


      Urine Ketones                                        NEGATIVE mg/dL


      Urine Nitrite                                        NEGATIVE mg/dL


      Urine Bilirubin                                      NEGATIVE mg/dL


      Urine Urobilinogen                                   NEGATIVE mg/dL


      Urine Leukocyte Esterase
            
               NEGATIVE
Maritza/ul


      Urine Hemoglobin                                     NEGATIVE mg/dL


      Urine Glucose                                              3+ mg/dL


      Urine Total Protein                                  NEGATIVE mg/dl





DIAGNOSTIC IMAGING REPORT





Patient: MARIS BIRCH   : 1985   Age: 33  Sex: M                    


   


MR #:    [id number]   Acct #:   [id number]    DOS: 19 [id number]


Ordering MD: GILDA PATTON MD   Location:  [facility]   Room/Bed:            


                               





PROCEDURE:   Lumbar spine series 


 


CLINICAL INDICATION:   Pain


 


TECHNIQUE:   AP, lateral and coned lateral images lumbar spine were obtained 


 


COMPARISON:   None 


 


FINDINGS:


Mild levorotatory scoliosis of the lower thoracic lumbar spine incompletely 


demonstrated apex at the T11 vertebral level. No evidence of acute fractures or 


subluxations. No focal bony blastic or lytic lesions. Posterior elements are 


intact. Exaggerated lumbar lordosis. 


 


IMPRESSION:


 


1.  Mild levorotatory scoliosis lower thoracic lumbar spine and exaggerated 


lumbar lordosis.


2.  No evidence of acute fractures or subluxations.





Procedures/MDM


At the time of discharge, patient nontoxic, ambulating,  vital signs stable, no 


gross neurologic deficit. differential diagnosis include but not limited to: 


lumbar sprain/strain, sciatica, herniated disk, UTI less likely pyelo, kidney 


stone. Neurovascular exam grossly intact. no clinical findings suggestive of 


acute infectious process, no acute deformity, no edema, no rashes.


Physical examination and clinical presentation consistent most likely with acute


on chronic back pain with sciatica.


Results and clinical impression discussed with the patient who agrees with 


management. The patient is stable to be treated outpatient and will be 


discharged home with recommendations and close monitoring





The patient was instructed to follow up with the primary care provider in the 


next 48h.  If symptoms persist, worsen or new symptoms develop, then patient 


should return to the ED immediately.





Instructions explained and given to patient with acknowledgment and demonstrated


understanding.





Disclaimer: Inadvertent spelling and grammatical errors are likely due to 


EHR/dictation software use and do not reflect on the overall quality of patient 


care. Also, please note that the electronic time recorded on this note does not 


necessarily reflect the actual time of the patient encounter.





Departure


Diagnosis:  


   Primary Impression:  


   Back pain with sciatica


Condition:  Stable


Patient Instructions:  Back Pain W/ Sciatica





Additional Instructions:  


Thank you very much for allowing us to participate in your care. 


Your health and safety is our top priority at [facility].





Call your primary care doctor TOMORROW for an appointment during the next 2-4 


days and bring all the information and medications prescribed. 





Have prescriptions filled and follow precisely the directions on the label. 





If the symptoms get worse and your provider is unavailable, return to the E


mergency Department immediately.











GILDA PATTON MD      Mar 27, 2019 03:51